# Patient Record
(demographics unavailable — no encounter records)

---

## 2024-11-17 NOTE — ASSESSMENT
[FreeTextEntry1] : SHAVONNE SAINZ is a 56 year old F who presents for follow up visit for repeat botox injections.  See procedure note above.   - F/u PRN

## 2024-11-17 NOTE — PROCEDURE
[Nl] : None [FreeTextEntry1] : Facial rhytids [FreeTextEntry2] : Botox injection [FreeTextEntry6] : The patient would like to undergo Botox injections at this time. Before the procedure the patient's name and  were confirmed with the patient. It was confirmed that the patient would not be allergic to the injection. The patient's skin was sterilely prepped. She received 32 units total: 14 units to the forehead, 10 units to the glabella, 4 units to the crow's feet bilaterally. Patient tolerated injections well with no complications. Ice was applied to the injected areas and was advised to continue to ice for the rest of the day to decrease swelling. Patient was advised to avoid drinking alcohol, strenuous activities, and heavy exercise for the next 24 hours.   Follow up PRN.

## 2024-11-17 NOTE — HISTORY OF PRESENT ILLNESS
[FreeTextEntry1] : SHAVONNE ALFREDOFRANCISCO JAVIER is a 56 year old F who presents for follow up visit for repeat botox injections.   Patient is in overall good health, feels well.

## 2025-06-12 NOTE — PROCEDURE
[Nl] : None [FreeTextEntry1] : Facial rhytids [FreeTextEntry2] : Botox injection [FreeTextEntry6] : The patient would like to undergo Botox injections at this time. Before the procedure the patient's name and  were confirmed with the patient. It was confirmed that the patient would not be allergic to the injection. The patient's skin was sterilely prepped. She received ? units total: ** units to the forehead, ** units to the glabella, ** units to the crow's feet bilaterally. Patient tolerated injections well with no complications. Ice was applied to the injected areas and was advised to continue to ice for the rest of the day to decrease swelling. Patient was advised to avoid drinking alcohol, strenuous activities, and heavy exercise for the next 24 hours.   Follow up PRN.

## 2025-06-12 NOTE — HISTORY OF PRESENT ILLNESS
[FreeTextEntry1] : SHAVONNE SAINZ is a 56 year old F who presents for follow up visit for repeat botox injections.   Patient is in overall good health, feels well.  Otherwise, denies cp, sob, subjective fever, chills, headache, cough, myalgia, malaise, abd pain, n/v/d, decreased appetite, and generalized weakness.

## 2025-06-12 NOTE — ADDENDUM
[FreeTextEntry1] :  This note was authored by Adry Trevino working as a scribe for Dr.Victor Madrid. I, Dr. Osorio Madrid have reviewed the content of this note and confirm it is true and accurate. I personally performed the history and physical examination and made all the decisions 06/11/2025